# Patient Record
Sex: MALE | Race: WHITE | NOT HISPANIC OR LATINO | Employment: FULL TIME | URBAN - METROPOLITAN AREA
[De-identification: names, ages, dates, MRNs, and addresses within clinical notes are randomized per-mention and may not be internally consistent; named-entity substitution may affect disease eponyms.]

---

## 2021-06-29 RX ORDER — TRAZODONE HYDROCHLORIDE 50 MG/1
TABLET ORAL
Qty: 90 TABLET | Refills: 1 | OUTPATIENT
Start: 2021-06-29

## 2023-03-17 ENCOUNTER — HOSPITAL ENCOUNTER (OUTPATIENT)
Dept: CARDIOLOGY | Facility: HOSPITAL | Age: 38
Discharge: HOME | End: 2023-03-17
Admitting: STUDENT IN AN ORGANIZED HEALTH CARE EDUCATION/TRAINING PROGRAM
Payer: COMMERCIAL

## 2023-03-17 ENCOUNTER — OFFICE VISIT (OUTPATIENT)
Dept: CARDIOLOGY | Facility: CLINIC | Age: 38
End: 2023-03-17
Payer: COMMERCIAL

## 2023-03-17 VITALS
OXYGEN SATURATION: 99 % | DIASTOLIC BLOOD PRESSURE: 84 MMHG | WEIGHT: 225 LBS | SYSTOLIC BLOOD PRESSURE: 130 MMHG | HEART RATE: 73 BPM | BODY MASS INDEX: 30.48 KG/M2 | RESPIRATION RATE: 18 BRPM | HEIGHT: 72 IN

## 2023-03-17 DIAGNOSIS — R00.2 PALPITATIONS: Primary | ICD-10-CM

## 2023-03-17 DIAGNOSIS — I49.1 PREMATURE ATRIAL COMPLEXES: ICD-10-CM

## 2023-03-17 DIAGNOSIS — R07.2 PRECORDIAL PAIN: ICD-10-CM

## 2023-03-17 DIAGNOSIS — F41.9 ANXIETY: ICD-10-CM

## 2023-03-17 DIAGNOSIS — R23.2 FLUSHING: ICD-10-CM

## 2023-03-17 DIAGNOSIS — R00.2 PALPITATIONS: ICD-10-CM

## 2023-03-17 LAB
BH CV STRESS BP STAGE 1: NORMAL
BH CV STRESS BP STAGE 2: NORMAL
BH CV STRESS BP STAGE 3: NORMAL
BH CV STRESS DURATION MIN STAGE 1: 3
BH CV STRESS DURATION MIN STAGE 2: 3
BH CV STRESS DURATION MIN STAGE 3: 3
BH CV STRESS DURATION MIN STAGE 4: 1
BH CV STRESS DURATION SEC STAGE 1: 0
BH CV STRESS DURATION SEC STAGE 2: 0
BH CV STRESS DURATION SEC STAGE 3: 0
BH CV STRESS DURATION SEC STAGE 4: 0
BH CV STRESS GRADE STAGE 1: 10
BH CV STRESS GRADE STAGE 2: 12
BH CV STRESS GRADE STAGE 3: 14
BH CV STRESS GRADE STAGE 4: 16
BH CV STRESS HR STAGE 1: 109
BH CV STRESS HR STAGE 2: 133
BH CV STRESS HR STAGE 3: 160
BH CV STRESS HR STAGE 4: 167
BH CV STRESS METS STAGE 1: 5
BH CV STRESS METS STAGE 2: 7.5
BH CV STRESS METS STAGE 3: 10
BH CV STRESS METS STAGE 4: 11
BH CV STRESS PROTOCOL 1: NORMAL
BH CV STRESS RECOVERY BP: NORMAL MMHG
BH CV STRESS RECOVERY HR: 98 BPM
BH CV STRESS SPEED STAGE 1: 1.7
BH CV STRESS SPEED STAGE 2: 2.5
BH CV STRESS SPEED STAGE 3: 3.4
BH CV STRESS SPEED STAGE 4: 4.2
BH CV STRESS STAGE 1: 1
BH CV STRESS STAGE 2: 2
BH CV STRESS STAGE 3: 3
BH CV STRESS STAGE 4: 4
MAXIMAL PREDICTED HEART RATE: 184 BPM
PERCENT MAX PREDICTED HR: 90.76 %
STRESS BASELINE BP: NORMAL MMHG
STRESS BASELINE HR: 96 BPM
STRESS PERCENT HR: 107 %
STRESS POST ESTIMATED WORKLOAD: 11 METS
STRESS POST EXERCISE DUR MIN: 10 MIN
STRESS POST EXERCISE DUR SEC: 0 SEC
STRESS POST PEAK BP: NORMAL MMHG
STRESS POST PEAK HR: 167 BPM
STRESS TARGET HR: 156 BPM

## 2023-03-17 PROCEDURE — 93000 ELECTROCARDIOGRAM COMPLETE: CPT | Performed by: STUDENT IN AN ORGANIZED HEALTH CARE EDUCATION/TRAINING PROGRAM

## 2023-03-17 PROCEDURE — 93017 CV STRESS TEST TRACING ONLY: CPT

## 2023-03-17 PROCEDURE — 93016 CV STRESS TEST SUPVJ ONLY: CPT | Performed by: STUDENT IN AN ORGANIZED HEALTH CARE EDUCATION/TRAINING PROGRAM

## 2023-03-17 PROCEDURE — 99204 OFFICE O/P NEW MOD 45 MIN: CPT | Performed by: STUDENT IN AN ORGANIZED HEALTH CARE EDUCATION/TRAINING PROGRAM

## 2023-03-17 PROCEDURE — 93018 CV STRESS TEST I&R ONLY: CPT | Performed by: STUDENT IN AN ORGANIZED HEALTH CARE EDUCATION/TRAINING PROGRAM

## 2023-03-17 RX ORDER — METOPROLOL SUCCINATE 50 MG/1
50 TABLET, EXTENDED RELEASE ORAL DAILY
COMMUNITY

## 2023-03-17 RX ORDER — OMEPRAZOLE 20 MG/1
20 CAPSULE, DELAYED RELEASE ORAL DAILY
COMMUNITY

## 2023-03-17 RX ORDER — GABAPENTIN 100 MG/1
100 CAPSULE ORAL
COMMUNITY
Start: 2023-02-01

## 2023-03-17 NOTE — PROGRESS NOTES
"      [facility]    Subjective:     Encounter Date:03/17/23      Patient ID: Sean Sotomayor is a 37 y.o. male.    Chief Complaint:   Chief Complaint   Patient presents with   • Heart Problem     Irregular Heartbeat      History of Present Illness    Mr. Sotomayor is a 37-year-old gentleman past medical history hyperlipidemia, hypertension, palpitations, who presents for further evaluation of palpitations.    He states that over the past 2 years, has had these intermittent, sporadic, bizarre sweating episodes.  He states that he will feel a \"heavy sensation in his head\" whenever he would bend forward or move around or exert himself.  He states that some days they be worse than others, its not constant.  He reports that sometimes with these episodes, he will have a skipped beat sensation in his heart, and also sometimes she will have these intermittent sporadic episodes of tightness in his chest.  He is been worked up by this by his PCP with blood work that is been unrevealing.  It were a 24-hour Holter which had some palpitation episodes that correlate with PACs, otherwise was unremarkable.    He wore a 24-hour Holter in January 21, 2022, he had palpitation symptoms that correlated with premature atrial beats, otherwise no sustained tachyarrhythmias or explanations of his symptoms.    He reports he takes metoprolol for palpitations and hypertension.  His last dose was yesterday morning.    He states that they have not been able to figure out why he has these weird intermittent facial flushing episodes in the setting of head fullness, he wonders if it is anxiety.    The following portions of the patient's history were reviewed and updated as appropriate: allergies, current medications, past family history, past medical history, past social history, past surgical history and problem list.    Past Medical History:   Diagnosis Date   • Hyperlipidemia    • Hypertension        History reviewed. No " "pertinent surgical history.        ECG 12 Lead    Date/Time: 3/17/2023 9:07 AM  Performed by: Champ Faust MD  Authorized by: Champ Faust MD   Comparison: not compared with previous ECG   Previous ECG: no previous ECG available  Rhythm: sinus rhythm  Rate: normal  Conduction: conduction normal  ST Segments: ST segments normal  T Waves: T waves normal  QRS axis: normal  Other: no other findings    Clinical impression: normal ECG               Objective:     Vitals:    03/17/23 0844   BP: 130/84   BP Location: Left arm   Patient Position: Sitting   Cuff Size: Adult   Pulse: 73   Resp: 18   SpO2: 99%   Weight: 102 kg (225 lb)   Height: 182.9 cm (72\")         Constitutional:       Appearance: Healthy appearance. Not in distress.   Neck:      Vascular: JVD normal.   Pulmonary:      Effort: Pulmonary effort is normal.      Breath sounds: Normal breath sounds.   Cardiovascular:      PMI at left midclavicular line. Normal rate. Regular rhythm. Normal S2.      Murmurs: There is no murmur.      Comments: Cardiac exam benign.  No murmurs.  Pulses:     Intact distal pulses.   Edema:     Peripheral edema absent.   Skin:     General: Skin is warm and dry.   Neurological:      General: No focal deficit present.      Mental Status: Alert, oriented to person, place, and time and oriented to person, place and time.   Psychiatric:         Mood and Affect: Mood and affect normal.         Lab Review:     I reviewed his labs from his PCP office visit on December 15, 2022  Hemoglobin 17  Creatinine 1.12  Mildly elevated LFTs, ALT 83, AST 45    A1c 5.6  TSH 1.2  No results found for: BUN, CREATININE, K, ALT, AST      Performed        Assessment:          Diagnosis Plan   1. Palpitations  ECG 12 Lead    Treadmill Stress Test             Plan:         1. Palpitations: He previously wore a 24-hour Holter, which showed PACs to correlate his symptoms.  He symptoms are stable since his prior evaluation.  Do not feel that further " monitoring is required at this time.  1. He is already metoprolol for hypertension, for which has helped some of his symptoms.  Continue.  2. He has not had an echocardiogram, will obtain  3. Reviewed his lab work per above, unremarkable.  2. Intermittent flushing episodes, diaphoresis.:  He reports these atypical episodes of a head fullness sensation that occur in the setting of facial and chin flushing.  He states that happen intermittently and sporadically, sometimes when he bends forward.  He states that he does feel anxious about and wonder if it is all anxiety, but a formal diagnosis has not been made.  1. He is quite anxious about the spells.  Seems that most of things been chalked up to anxiety, however patient states he did feel better if heart was checked out.  There is a family history of coronary heart disease, mostly in older individuals.  He does have significant hyperlipidemia  2. Obtain treadmill stress test, which today showed no arrhythmias, no bringing on of his symptoms, and no ischemia.  Normal GXT DTS +10.  Low risk for ischemic heart disease.  3. I reassured patient that so far his above testing is negative, his Holter is without significant arrhythmias.  His symptoms are likely noncardiac in origin, will await the echo, but his exam without murmurs and otherwise unremarkable..  3. Chest pain: Very atypical, occurs at rest.  He does state that he has significant diaphoresis that happen sporadically.  4. Hyperlipidemia  1. We will obtain treadmill stress test per above  2. Currently, he does not require treatment for his hyperlipidemia, his LDL is 172.  Patient educated about healthy lifestyle, weight loss, and Mediterranean diet.    Thank you for allowing me to participate in the care of Sean ENRIQUEZ Emilysteve. Feel free to contact me directly with any further questions or concerns.    RTC 6 months for symptom check-in, [place] office, we will obtain echocardiogram in interim.    Champ Faust,  MD [facility]  03/17/23  09:03 EDT       Current Outpatient Medications:   •  fluticasone (FLONASE) 50 MCG/ACT nasal spray, 2 sprays into the nostril(s) as directed by provider Daily. (Patient taking differently: 2 sprays into the nostril(s) as directed by provider Daily. As needed for allergies), Disp: 15.8 mL, Rfl: 0  •  gabapentin (NEURONTIN) 100 MG capsule, Take 1 capsule by mouth every night at bedtime., Disp: , Rfl:   •  metoprolol succinate XL (TOPROL-XL) 50 MG 24 hr tablet, Take 1 tablet by mouth Daily., Disp: , Rfl:   •  omeprazole (priLOSEC) 20 MG capsule, Take 1 capsule by mouth Daily., Disp: , Rfl:   •  traZODone (DESYREL) 50 MG tablet, Take 25 mg by mouth Every Night., Disp: , Rfl:          No follow-ups on file.      Part of this note may be an electronic transcription/translation of spoken language to printed text using the Dragon Dictation System.